# Patient Record
Sex: MALE | Race: WHITE
[De-identification: names, ages, dates, MRNs, and addresses within clinical notes are randomized per-mention and may not be internally consistent; named-entity substitution may affect disease eponyms.]

---

## 2021-07-01 ENCOUNTER — HOSPITAL ENCOUNTER (OUTPATIENT)
Dept: HOSPITAL 47 - ORWHC2ENDO | Age: 61
Discharge: HOME | End: 2021-07-01
Attending: SURGERY
Payer: COMMERCIAL

## 2021-07-01 VITALS — HEART RATE: 68 BPM | DIASTOLIC BLOOD PRESSURE: 86 MMHG | SYSTOLIC BLOOD PRESSURE: 136 MMHG

## 2021-07-01 VITALS — BODY MASS INDEX: 38.6 KG/M2

## 2021-07-01 VITALS — RESPIRATION RATE: 16 BRPM | TEMPERATURE: 97.8 F

## 2021-07-01 DIAGNOSIS — K57.90: ICD-10-CM

## 2021-07-01 DIAGNOSIS — Z79.899: ICD-10-CM

## 2021-07-01 DIAGNOSIS — K64.0: ICD-10-CM

## 2021-07-01 DIAGNOSIS — Z12.11: Primary | ICD-10-CM

## 2021-07-01 DIAGNOSIS — Z87.442: ICD-10-CM

## 2021-07-01 NOTE — P.PCN
Date of Procedure: 07/01/21


Description of Procedure: 











PREOPERATIVE DIAGNOSIS:


Colonoscopy screening.





POSTOPERATIVE DIAGNOSIS:


Colonoscopy screening.


Diverticulosis, scattered.





OPERATION:


Colonoscopy to the cecum





SURGEON: Araceli Owusu MD.





ANESTHESIA: MAC.





INDICATIONS:


The patient is a 61-year-old male who presents for his first colonoscopy 

screening.  Benefits and risks were described and informed consent was obtained.





DESCRIPTION OF PROCEDURE:


The patient had undergone Suprep.  The patient had been brought into the op

erating room and laid in the left lateral decubitus position. After adequate 

intravenous sedation, the rectum was examined with 2% lidocaine jelly. No 

external hemorrhoids were encountered.  The prostate was unremarkable.  The 

rectal tone was within normal limits. No lesions were palpated in the rectal 

vault. An Olympus colonoscope was advanced to the cecum with the help of 

abdominal wall pressure.  The prep was fair with limited view of the mucosa due 

to residual stool.  Moderate sigmoid diverticulosis was encountered.  No large 

colonic polyps were found.  No evidence of focal colitis was found. Retroflexion

of the scope demonstrated grade 1 internal hemorrhoids without active bleeding 

or inflammation. The colon was desufflated. The patient had tolerated the 

procedure well. 





Withdrawal time was over 6 minutes.





FINDINGS:


Aronchick preparation quality scale 3 (1-5)


Internal hemorrhoids, grade 1


No external prolapsed hemorrhoids.


No arteriovenous malformations.


No adenomatous polyps.


No focal colitis.





RECOMMENDATIONS:


Lower endoscopy in 5 years, 2026


Recommend prolonged bowel prep





Plan - Discharge Summary


Discharge Rx Participant: No


New Discharge Prescriptions: 


Continue


   Cholecalciferol [Vitamin D3 (25 Mcg = 1000 Iu)] 25 mcg PO DAILY


   Allopurinol [Zyloprim] 300 mg PO DAILY


Discharge Medication List





Allopurinol [Zyloprim] 300 mg PO DAILY 06/28/21 [History]


Cholecalciferol [Vitamin D3 (25 Mcg = 1000 Iu)] 25 mcg PO DAILY 06/28/21 

[History]








Follow up Appointment(s)/Referral(s): 


Araceli Owusu MD [STAFF PHYSICIAN] - 07/20/21


Patient Instructions/Handouts:  Diverticulosis Diet (GEN), Diverticulosis (DC)


Activity/Diet/Wound Care/Special Instructions: 


Repeat colonoscopy 5 years


Discharge Disposition: HOME SELF-CARE

## 2021-07-01 NOTE — P.GSHP
History of Present Illness


H&P Date: 07/01/21














CHIEF COMPLAINT: Colon screen





HISTORY OF PRESENT ILLNESS: The patient is a 61-year-old male who


presents for colon screen.  Lower endoscopy was offered for further evaluation 

and management.





PAST MEDICAL HISTORY: 


Please see list.





PAST SURGICAL HISTORY: 


Please see list.





MEDICATIONS: 


Please see list.





ALLERGIES:  Please see list. 





SOCIAL HISTORY: No illicit drug use





FAMILY HISTORY: No reports of Crohn disease or ulcerative colitis. 





REVIEW OF ORGAN SYSTEMS: 


CONSTITUTIONAL: No reports of fevers or chills.  





PHYSICAL EXAM: 


VITAL SIGNS:  Stable


GENERAL: Well-developed pleasant in no acute distress. 


HEENT: No scleral icterus. Extraocular movements grossly


intact. Moist buccal mucosa. 


NECK: Supple without lymphadenopathy. 


CHEST: Unlabored respirations. Equal bilateral excursions. 


CARDIOVASCULAR: Regular rate and rhythm. Distal 2+ pulses. 


ABDOMEN: Soft, nontender, nondistended. 


MUSCULOSKELETAL: No clubbing, cyanosis, or edema. 





ASSESSMENT: 


1.  Colon screen.





PLAN: 


1. Recommend proceeding with a lower endoscopy





Past Medical History


Additional Past Medical History / Comment(s): kidney stones


History of Any Multi-Drug Resistant Organisms: None Reported


Past Surgical History: Cholecystectomy


Additional Past Surgical History / Comment(s): lithotripsy and stenting for 

kidney stones


Past Anesthesia/Blood Transfusion Reactions: No Reported Reaction


Additional Past Anesthesia/Blood Transfusion Reaction / Comment(s): father woke 

up during surgery


Smoking Status: Never smoker





Medications and Allergies


                                Home Medications











 Medication  Instructions  Recorded  Confirmed  Type


 


Allopurinol [Zyloprim] 300 mg PO DAILY 06/28/21 07/01/21 History


 


Cholecalciferol [Vitamin D3 (25 25 mcg PO DAILY 06/28/21 07/01/21 History





Mcg = 1000 Iu)]    








                                    Allergies











Allergy/AdvReac Type Severity Reaction Status Date / Time


 


No Known Allergies Allergy   Verified 07/01/21 07:41














Surgical - Exam


                                   Vital Signs











Temp Pulse Resp BP Pulse Ox


 


 97.8 F   76   16   143/76   96 


 


 07/01/21 07:41  07/01/21 07:41  07/01/21 07:41  07/01/21 07:41  07/01/21 07:41